# Patient Record
Sex: MALE | Race: OTHER | URBAN - METROPOLITAN AREA
[De-identification: names, ages, dates, MRNs, and addresses within clinical notes are randomized per-mention and may not be internally consistent; named-entity substitution may affect disease eponyms.]

---

## 2017-09-18 ENCOUNTER — OFFICE VISIT (OUTPATIENT)
Dept: DERMATOLOGY | Facility: AMBULATORY SURGERY CENTER | Age: 62
End: 2017-09-18

## 2017-09-18 VITALS
RESPIRATION RATE: 16 BRPM | HEART RATE: 78 BPM | TEMPERATURE: 98.8 F | DIASTOLIC BLOOD PRESSURE: 84 MMHG | OXYGEN SATURATION: 97 % | SYSTOLIC BLOOD PRESSURE: 128 MMHG

## 2017-09-18 DIAGNOSIS — L82.1 SEBORRHEIC KERATOSES: Primary | ICD-10-CM

## 2017-09-18 DIAGNOSIS — L81.4 LENTIGINES: ICD-10-CM

## 2017-09-18 DIAGNOSIS — L57.0 ACTINIC KERATOSIS: ICD-10-CM

## 2017-09-18 DIAGNOSIS — D18.01 CHERRY ANGIOMA: ICD-10-CM

## 2017-09-18 DIAGNOSIS — D22.9 MULTIPLE BENIGN NEVI: ICD-10-CM

## 2017-09-18 RX ORDER — LISINOPRIL 20 MG/1
TABLET ORAL DAILY
COMMUNITY

## 2017-09-18 RX ORDER — ATORVASTATIN CALCIUM 20 MG/1
TABLET, FILM COATED ORAL DAILY
COMMUNITY

## 2017-09-18 NOTE — PROGRESS NOTES
Name: Bina Craig       Age: 60 y.o. Date: 9/18/2017    Chief Complaint:   Chief Complaint   Patient presents with    Skin Exam       Subjective:    HPI  Mr. Bina Craig is a 60 y.o. male who presents as a new patient to [place] [id number] for a skin exam.  The patient was referred for this evaluation by himself. The patient has had a skin exam in the past (last about 1.5 yrs ago) and the patient does have current complaints related to his skin. He states he has noticed some lesions on the left shoulder as well as his back and his wife is no skin lesions on his chest.  He does admit to sun exposure through his work and does not typically wear sunscreen. He does typically worsens when however when he is at the beach. He reports having dermatology exams in the past with no significant findings. He does admit to sunburns in the past including one this summer. He states it seems after this sunburn in the summer that the lesion on his chest were more scaly than they had been. The patient's pertinent skin history includes: He does have a family history of skin cancer. No personal history of skin cancer but does have a history of sunburns and significant sun exposure through his work. ROS: Constitutional: Negative. Dermatological : positive for - skin lesion changes      Social History     Social History    Marital status:      Spouse name: N/A    Number of children: N/A    Years of education: N/A     Occupational History    Not on file. Social History Main Topics    Smoking status: Not on file    Smokeless tobacco: Not on file    Alcohol use Not on file    Drug use: Not on file    Sexual activity: Not on file     Other Topics Concern    Not on file     Social History Narrative    No narrative on file       No family history on file.     Past Medical History:   Diagnosis Date    Family history of skin cancer     Sun-damaged skin     Sunburn, blistering        No past surgical history on file. Current Outpatient Prescriptions   Medication Sig Dispense Refill    lisinopril (PRINIVIL, ZESTRIL) 20 mg tablet Take  by mouth daily.  atorvastatin (LIPITOR) 20 mg tablet Take  by mouth daily. No Known Allergies      Objective:    Visit Vitals    /84    Pulse 78    Temp 98.8 °F (37.1 °C)    Resp 16    SpO2 97%       Jeri Back is a 60 y.o. male who appears well and in no distress. He is awake, alert, and oriented. There is no preauricular, submandibular, or cervical lymphadenopathy. A skin examination was performed including his scalp, face (including eyelid), ears, neck, chest, back, abdomen, upper extremities (including digits/nails), lower extremities, breasts, buttocks; genital skin was not examined. He has lentigines on sun exposed areas. There are few areas of sebaceous hyperplasia on the face. On the vertex scalp, left side there is 1 broad but thin scaled actinic keratosis. There are scattered cherry angiomas. He has multiple stuck on waxy macules and keratotic papules consistent with seborrheic keratoses, including the lesions of his concern on his back and left shoulder as well as those of the chest which you have mild inflammation. There are medium brown junctional nevi without concerning features and a few intradermal nevi that are also without evidence of severe atypia. Assessment/Plan: 1. Actinic Keratoses. The diagnosis of this precancerous lesion related to sun exposure was reviewed. Verbal consent was obtained. I treated 1 lesions with cryotherapy and post-cryotherapy care was reviewed. [facility]   OFFICE PROCEDURE PROGRESS NOTE   Chart reviewed for the following:   Renato CANTOR, have reviewed the History, Physical and updated the Allergic reactions for Jeri Back. TIME OUT performed immediately prior to start of procedure:   Dana CANTOR Ijeoma Maurice, have performed the following reviews on Thaddeus August   prior to the start of the procedure:     * Patient was identified by name and date of birth   * Agreement on procedure being performed was verified   * Risks and Benefits explained to the patient   * Procedure site verified and marked as necessary   * Patient was positioned for comfort   * Verbal consent was obtained    Time: 1325  Date of procedure: 9/18/2017  Procedure performed by: Krystina Israel. Na June DNP  Provider assisted by: none   Patient assisted by: self   How tolerated by patient: tolerated the procedure well with no complications   Comments: none    2. Seborrheic keratoses. The diagnosis was reviewed and the patient was reassured that no treatment is needed for these benign lesions. 3.Solar lentigos. The diagnosis and relationship to sun exposure was reviewed. Sun protection advised. 4. Normal nevi. The diagnosis of normal nevi was reviewed. I discussed sun protection, sunscreen use, the warning signs of skin cancer, the need for self-skin examinations, and the need for regular practitioner exams every 1-2 years. The patient should follow up sooner as needed if new, changing, or symptomatic skin lesions arise. 5. Cherry angiomas. The diagnosis was reviewed and the patient was reassured that no treatment is needed for these benign lesions.